# Patient Record
Sex: FEMALE | Race: BLACK OR AFRICAN AMERICAN | NOT HISPANIC OR LATINO | URBAN - METROPOLITAN AREA
[De-identification: names, ages, dates, MRNs, and addresses within clinical notes are randomized per-mention and may not be internally consistent; named-entity substitution may affect disease eponyms.]

---

## 2023-07-17 ENCOUNTER — EMERGENCY (EMERGENCY)
Facility: HOSPITAL | Age: 18
LOS: 1 days | Discharge: HOME | End: 2023-07-17
Admitting: STUDENT IN AN ORGANIZED HEALTH CARE EDUCATION/TRAINING PROGRAM
Payer: COMMERCIAL

## 2023-07-17 VITALS
HEART RATE: 106 BPM | TEMPERATURE: 98 F | RESPIRATION RATE: 16 BRPM | WEIGHT: 199.96 LBS | DIASTOLIC BLOOD PRESSURE: 83 MMHG | OXYGEN SATURATION: 100 % | SYSTOLIC BLOOD PRESSURE: 130 MMHG

## 2023-07-17 DIAGNOSIS — M79.671 PAIN IN RIGHT FOOT: ICD-10-CM

## 2023-07-17 DIAGNOSIS — Z87.81 PERSONAL HISTORY OF (HEALED) TRAUMATIC FRACTURE: ICD-10-CM

## 2023-07-17 DIAGNOSIS — R20.2 PARESTHESIA OF SKIN: ICD-10-CM

## 2023-07-17 DIAGNOSIS — Z91.018 ALLERGY TO OTHER FOODS: ICD-10-CM

## 2023-07-17 PROCEDURE — 73630 X-RAY EXAM OF FOOT: CPT | Mod: 26,RT

## 2023-07-17 PROCEDURE — 99283 EMERGENCY DEPT VISIT LOW MDM: CPT | Mod: 25

## 2023-07-17 PROCEDURE — 99284 EMERGENCY DEPT VISIT MOD MDM: CPT

## 2023-07-17 PROCEDURE — 73630 X-RAY EXAM OF FOOT: CPT

## 2023-07-17 RX ORDER — POLYMYXIN B SULF/TRIMETHOPRIM 10000-1/ML
1 DROPS OPHTHALMIC (EYE)
Qty: 1 | Refills: 0
Start: 2023-07-17 | End: 2023-07-21

## 2023-07-17 NOTE — ED PROVIDER NOTE - MUSCULOSKELETAL, MLM
right foot with no swelling, no skin discoloration, DP/PT 2+, no bony tenderness, sensation grossly intact distally, strength 5/5

## 2023-07-17 NOTE — ED ADULT NURSE NOTE - NS_NURSE_DISC_TEACHING_YN_ED_ALL_ED
Left message on answering machine for patient to call back.    Routed to Dr. Olson for advice.  Thank you   Yes

## 2023-07-17 NOTE — ED PROVIDER NOTE - PATIENT PORTAL LINK FT
You can access the FollowMyHealth Patient Portal offered by [facility] by registering at the following website: [email or web address]. By joining [employer]’s FollowMyHealth portal, you will also be able to view your health information using other applications (apps) compatible with our system.

## 2023-07-17 NOTE — ED PROVIDER NOTE - NSFOLLOWUPINSTRUCTIONS_ED_ALL_ED_FT
Peripheral Neuropathy  Peripheral neuropathy is a type of nerve damage. It affects nerves that carry signals between the spinal cord and the arms, legs, and the rest of the body (peripheral nerves). It does not affect nerves in the spinal cord or brain. In peripheral neuropathy, one nerve or a group of nerves may be damaged. Peripheral neuropathy is a broad category that includes many specific nerve disorders, like diabetic neuropathy, hereditary neuropathy, and carpal tunnel syndrome.    What are the causes?  This condition may be caused by:  Certain diseases, such as:  Diabetes. This is the most common cause of peripheral neuropathy.  Autoimmune diseases, such as rheumatoid arthritis and systemic lupus erythematosus.  Nerve diseases that are passed from parent to child (inherited).  Kidney disease.  Thyroid disease.  Other causes may include:  Nerve injury.  Pressure or stress on a nerve that lasts a long time.  Lack (deficiency) of B vitamins. This can result from alcoholism, poor diet, or a restricted diet.  Infections.  Some medicines, such as cancer medicines (chemotherapy).  Poisonous (toxic) substances, such as lead and mercury.  Too little blood flowing to the legs.  In some cases, the cause of this condition is not known.    What are the signs or symptoms?  Symptoms of this condition depend on which of your nerves is damaged.  Symptoms in the legs, hands, and arms can include:  Loss of feeling (numbness) in the feet, hands, or both.  Tingling in the feet, hands, or both.  Burning pain.  Very sensitive skin.  Weakness.  Not being able to move a part of the body (paralysis).  Clumsiness or poor coordination.  Muscle twitching.  Loss of balance.  Symptoms in other parts of the body can include:  Not being able to control your bladder.  Feeling dizzy.  Sexual problems.  How is this diagnosed?  Diagnosing and finding the cause of peripheral neuropathy can be difficult. Your health care provider will take your medical history and do a physical exam. A neurological exam will also be done. This involves checking things that are affected by your brain, spinal cord, and nerves (nervous system). For example, your health care provider will check your reflexes, how you move, and what you can feel.    You may have other tests, such as:  Blood tests.  Electromyogram (EMG) and nerve conduction tests. These tests check nerve function and how well the nerves are controlling the muscles.  Imaging tests, such as a CT scan or MRI, to rule out other causes of your symptoms.  Removing a small piece of nerve to be examined in a lab (nerve biopsy).  Removing and examining a small amount of the fluid that surrounds the brain and spinal cord (lumbar puncture).  How is this treated?  Treatment for this condition may involve:  Treating the underlying cause of the neuropathy, such as diabetes, kidney disease, or vitamin deficiencies.  Stopping medicines that can cause neuropathy, such as chemotherapy.  Medicine to help relieve pain. Medicines may include:  Prescription or over-the-counter pain medicine.  Anti-seizure medicine.  Antidepressants.  Pain-relieving patches that are applied to painful areas of skin.  Surgery to relieve pressure on a nerve or to destroy a nerve that is causing pain.  Physical therapy to help improve movement and balance.  Devices to help you move around (assistive devices).  Follow these instructions at home:  Medicines    Take over-the-counter and prescription medicines only as told by your health care provider. Do not take any other medicines without first asking your health care provider.  Ask your health care provider if the medicine prescribed to you requires you to avoid driving or using machinery.  Lifestyle    A plate along with examples of foods in a healthy diet.  Do not use any products that contain nicotine or tobacco. These products include cigarettes, chewing tobacco, and vaping devices, such as e-cigarettes. Smoking keeps blood from reaching damaged nerves. If you need help quitting, ask your health care provider.  Avoid or limit alcohol. Too much alcohol can cause a vitamin B deficiency, and vitamin B is needed for healthy nerves.  Eat a healthy diet. This includes:  Eating foods that are high in fiber, such as beans, whole grains, and fresh fruits and vegetables.  Limiting foods that are high in fat and processed sugars, such as fried or sweet foods.  General instructions    Barefoot person sitting with right leg crossed over left, using handheld mirror to see bottom of right foot.  If you have diabetes, work closely with your health care provider to keep your blood sugar under control.  If you have numbness in your feet:  Check every day for signs of injury or infection. Watch for redness, warmth, and swelling.  Wear padded socks and comfortable shoes. These help protect your feet.  Develop a good support system. Living with peripheral neuropathy can be stressful. Consider talking with a mental health specialist or joining a support group.  Use assistive devices and attend physical therapy as told by your health care provider. This may include using a walker or a cane.  Keep all follow-up visits. This is important.  Where to find more information  [employer]: www.ninds.nih.gov  Contact a health care provider if:  You have new signs or symptoms of peripheral neuropathy.  You are struggling emotionally from dealing with peripheral neuropathy.  Your pain is not well controlled.  Get help right away if:  You have an injury or infection that is not healing normally.  You develop new weakness in an arm or leg.  You have fallen or do so frequently.  Summary  Peripheral neuropathy is when the nerves in the arms or legs are damaged, resulting in numbness, weakness, or pain.  There are many causes of peripheral neuropathy, including diabetes, pinched nerves, vitamin deficiencies, autoimmune disease, and hereditary conditions.  Diagnosing and finding the cause of peripheral neuropathy can be difficult. Your health care provider will take your medical history, do a physical exam, and do tests, including blood tests and nerve function tests.  Treatment involves treating the underlying cause of the neuropathy and taking medicines to help control pain. Physical therapy and assistive devices may also help.  This information is not intended to replace advice given to you by your health care provider. Make sure you discuss any questions you have with your health care provider.

## 2023-07-17 NOTE — ED ADULT NURSE NOTE - NSFALLUNIVINTERV_ED_ALL_ED
Bed/Stretcher in lowest position, wheels locked, appropriate side rails in place/Call bell, personal items and telephone in reach/Instruct patient to call for assistance before getting out of bed/chair/stretcher/Non-slip footwear applied when patient is off stretcher/[place] to call system/Physically safe environment - no spills, clutter or unnecessary equipment/Purposeful proactive rounding/Room/bathroom lighting operational, light cord in reach

## 2023-07-17 NOTE — ED PROVIDER NOTE - CLINICAL SUMMARY MEDICAL DECISION MAKING FREE TEXT BOX
16 y/o f presents c/o persistent pain, tingling to right foot after a fracture last year.  No weakness on exam, decreased sensation across the dorsum of foot, xr unremarkable.  Will recommend f/u podiatry and neurology, referral coordinator messaged to expedite f/u.

## 2023-07-17 NOTE — ED PROVIDER NOTE - OBJECTIVE STATEMENT
18 y/o f presents c/o persistent tingling, pain to her right foot for the past several months.  Pt stating she had an avulsion fracture last year and since then she has had these symptoms.  Pt stating she has seen her orthopedist and neurology and told it was a "nerve problem" but sx not improving.  Pt doesn't take anything for pain.  Denies new trauma, weakness, all other ROS negative.